# Patient Record
Sex: MALE | Race: WHITE | Employment: FULL TIME | URBAN - METROPOLITAN AREA
[De-identification: names, ages, dates, MRNs, and addresses within clinical notes are randomized per-mention and may not be internally consistent; named-entity substitution may affect disease eponyms.]

---

## 2017-03-20 ENCOUNTER — APPOINTMENT (OUTPATIENT)
Dept: GENERAL RADIOLOGY | Age: 42
End: 2017-03-20
Attending: EMERGENCY MEDICINE
Payer: OTHER MISCELLANEOUS

## 2017-03-20 ENCOUNTER — HOSPITAL ENCOUNTER (EMERGENCY)
Age: 42
Discharge: HOME | End: 2017-03-20
Attending: EMERGENCY MEDICINE
Payer: OTHER MISCELLANEOUS

## 2017-03-20 VITALS
WEIGHT: 156 LBS | RESPIRATION RATE: 18 BRPM | OXYGEN SATURATION: 99 % | HEART RATE: 88 BPM | BODY MASS INDEX: 23.11 KG/M2 | TEMPERATURE: 98 F | SYSTOLIC BLOOD PRESSURE: 137 MMHG | DIASTOLIC BLOOD PRESSURE: 90 MMHG | HEIGHT: 69 IN

## 2017-03-20 DIAGNOSIS — S93.402A MILD ANKLE SPRAIN, LEFT, INITIAL ENCOUNTER: Primary | ICD-10-CM

## 2017-03-20 PROCEDURE — 99282 EMERGENCY DEPT VISIT SF MDM: CPT

## 2017-03-20 PROCEDURE — 73610 X-RAY EXAM OF ANKLE: CPT

## 2017-03-20 RX ORDER — IBUPROFEN 800 MG/1
800 TABLET ORAL EVERY 8 HOURS
Qty: 15 TAB | Refills: 0 | Status: SHIPPED | OUTPATIENT
Start: 2017-03-20 | End: 2017-03-25

## 2017-03-20 RX ORDER — HYDROCODONE BITARTRATE AND ACETAMINOPHEN 5; 325 MG/1; MG/1
TABLET ORAL
Qty: 6 TAB | Refills: 0 | Status: SHIPPED | OUTPATIENT
Start: 2017-03-20 | End: 2020-10-03

## 2017-03-20 NOTE — DISCHARGE INSTRUCTIONS
SPECIFIC PATIENT INSTRUCTIONS FROM THE PHYSICIAN WHO TREATED YOU IN THE ER TODAY:  1. Wear the air splint for the next 1-2 weeks. 2. Take the ibuprofen as prescribed until finished. 3. Take the Norco for pain not controlled by the ibuprofen. 4. Follow up with your orthopedist or the one listed below if can not walk/ bear weight without air splint or crutches after 3-4 days. 5. IF crutches were given to you, use them as instructed for the next week, slowly increasing your weight bearing on your sprained ankle with the assistance of the crutches. Ankle Sprain: Care Instructions  Your Care Instructions    An ankle sprain can happen when you twist your ankle. The ligaments that support the ankle can get stretched and torn. Often the ankle is swollen and painful. Ankle sprains may take from several weeks to several months to heal. Usually, the more pain and swelling you have, the more severe your ankle sprain is and the longer it will take to heal. You can heal faster and regain strength in your ankle with good home treatment. It is very important to give your ankle time to heal completely, so that you do not easily hurt your ankle again. Follow-up care is a key part of your treatment and safety. Be sure to make and go to all appointments, and call your doctor if you are having problems. It's also a good idea to know your test results and keep a list of the medicines you take. How can you care for yourself at home? · Prop up your foot on pillows as much as possible for the next 3 days. Try to keep your ankle above the level of your heart. This will help reduce the swelling. · Follow your doctor's directions for wearing a splint or elastic bandage. Wrapping the ankle may help reduce or prevent swelling. · Your doctor may give you a splint, a brace, an air stirrup, or another form of ankle support to protect your ankle until it is healed. Wear it as directed while your ankle is healing.  Do not remove it unless your doctor tells you to. After your ankle has healed, ask your doctor whether you should wear the brace when you exercise. · Put ice or cold packs on your injured ankle for 10 to 20 minutes at a time. Try to do this every 1 to 2 hours for the next 3 days (when you are awake) or until the swelling goes down. Put a thin cloth between the ice and your skin. · You may need to use crutches until you can walk without pain. If you do use crutches, try to bear some weight on your injured ankle if you can do so without pain. This helps the ankle heal.  · Take pain medicines exactly as directed. ¨ If the doctor gave you a prescription medicine for pain, take it as prescribed. ¨ If you are not taking a prescription pain medicine, ask your doctor if you can take an over-the-counter medicine. · If you have been given ankle exercises to do at home, do them exactly as instructed. These can promote healing and help prevent lasting weakness. When should you call for help? Call your doctor now or seek immediate medical care if:  · Your pain is getting worse. · Your swelling is getting worse. · Your splint feels too tight or you are unable to loosen it. Watch closely for changes in your health, and be sure to contact your doctor if:  · You are not getting better after 1 week. Where can you learn more? Go to http://gisela-ivan.info/. Enter F625 in the search box to learn more about \"Ankle Sprain: Care Instructions. \"  Current as of: May 23, 2016  Content Version: 11.1  © 3738-4547 Healthwise, [employer]. Care instructions adapted under license by [employer] (which disclaims liability or warranty for this information). If you have questions about a medical condition or this instruction, always ask your healthcare professional. [address] any warranty or liability for your use of this information.          Ankle Sprain: Rehab Exercises  Your Care Instructions  Here are some examples of typical rehabilitation exercises for your condition. Start each exercise slowly. Ease off the exercise if you start to have pain. Your doctor or physical therapist will tell you when you can start these exercises and which ones will work best for you. How to do the exercises  \"Alphabet\" exercise    1. Trace the alphabet with your toe. This helps your ankle move in all directions. Side-to-side knee swing exercise    1. Sit in a chair with your foot flat on the floor. 2. Slowly move your knee from side to side. Keep your foot pressed flat. 3. Continue this exercise for 2 to 3 minutes. Towel curl    1. While sitting, place your foot on a towel on the floor. Scrunch the towel toward you with your toes. 2. Then use your toes to push the towel away from you. 3. To make this exercise more challenging you can put something on the other end of the towel. A can of soup is about the right weight for this. Towel stretch    1. Sit with your legs extended and knees straight. 2. Place a towel around your foot just under the toes. 3. Hold each end of the towel in each hand, with your hands above your knees. 4. Pull back with the towel so that your foot stretches toward you. 5. Hold the position for at least 15 to 30 seconds. 6. Repeat 2 to 4 times a session. Do up to 5 sessions a day. Ankle eversion exercise    1. Start by sitting with your foot flat on the floor. Push your foot outward against a wall or a piece of furniture that doesn't move. Hold for about 6 seconds, and relax. Repeat 8 to 12 times. 2. After you feel comfortable with this, try using rubber tubing looped around the outside of your feet for resistance. Push your foot out to the side against the tubing, and then count to 10 as you slowly bring your foot back to the middle. Repeat 8 to 12 times. Isometric opposition exercises    1. While sitting, put your feet together flat on the floor.   2. Press your injured foot inward against your other foot. Hold for about 6 seconds, and relax. Repeat 8 to 12 times. 3. Then place the heel of your other foot on top of the injured one. Push down with the top heel while trying to push up with your injured foot. Hold for about 6 seconds, and relax. Repeat 8 to 12 times. Resisted ankle inversion    1. Sit on the floor with your good leg crossed over your other leg. 2. Hold both ends of an exercise band and loop the band around the inside of your affected foot. Then press your other foot against the band. 3. Keeping your legs crossed, slowly push your affected foot against the band so that foot moves away from your other foot. Then slowly relax. 4. Repeat 8 to 12 times. Resisted ankle eversion    1. Sit on the floor with your legs straight. 2. Hold both ends of an exercise band and loop the band around the outside of your affected foot. Then press your other foot against the band. 3. Keeping your leg straight, slowly push your affected foot outward against the band and away from your other foot without letting your leg rotate. Then slowly relax. 4. Repeat 8 to 12 times. Resisted ankle dorsiflexion    1. Tie the ends of an exercise band together to form a loop. Attach one end of the loop to a secure object or shut a door on it to hold it in place. (Or you can have someone hold one end of the loop to provide resistance.)  2. While sitting on the floor or in a chair, loop the other end of the band over the top of your affected foot. 3. Keeping your knee and leg straight, slowly flex your foot to pull back on the exercise band, and then slowly relax. 4. Repeat 8 to 12 times. Single-leg balance    1. Stand on a flat surface with your arms stretched out to your sides like you are making the letter \"T. \" Then lift your good leg off the floor, bending it at the knee. If you are not steady on your feet, use one hand to hold on to a chair, counter, or wall.   2. Standing on the leg with your affected ankle, keep that knee straight. Try to balance on that leg for up to 30 seconds. Then rest for up to 10 seconds. 3. Repeat 6 to 8 times. 4. When you can balance on your affected leg for 30 seconds with your eyes open, try to balance on it with your eyes closed. When you can do this exercise with your eyes closed for 30 seconds and with ease and no pain, try standing on a pillow or piece of foam, and repeat steps 1 through 4. Follow-up care is a key part of your treatment and safety. Be sure to make and go to all appointments, and call your doctor if you are having problems. It's also a good idea to know your test results and keep a list of the medicines you take. Where can you learn more? Go to http://gisela-ivan.info/. Kristen Rollins in the search box to learn more about \"Ankle Sprain: Rehab Exercises. \"  Current as of: May 23, 2016  Content Version: 11.1  © 2797-5963 [employer]. Care instructions adapted under license by [employer] (which disclaims liability or warranty for this information). If you have questions about a medical condition or this instruction, always ask your healthcare professional. [place] [address] any warranty or liability for your use of this information. [facility] Activation    Thank you for requesting access to [facility]. Please follow the instructions below to securely access and download your online medical record. [facility] allows you to send messages to your doctor, view your test results, renew your prescriptions, schedule appointments, and more. How Do I Sign Up? 1. In your internet browser, go to [email or web address]. 2. Click on the First Time User? Click Here link in the Sign In box. You will see the New Member Sign Up page. 3. Enter your [facility]by Access Code exactly as it appears below. You will not need to use this code after youve completed the sign-up process.  If you do not sign up before the expiration date, you must request a new code. [facility] Access Code: [email or web address]  Expires: 2017  2:36 PM (This is the date your [employer] access code will )    4. Enter the last four digits of your Social Security Number (xxxx) and Date of Birth (mm/dd/yyyy) as indicated and click Submit. You will be taken to the next sign-up page. 5. Create a [facility]sec ID. This will be your [facility] login ID and cannot be changed, so think of one that is secure and easy to remember. 6. Create a [facility] password. You can change your password at any time. 7. Enter your Password Reset Question and Answer. This can be used at a later time if you forget your password. 8. Enter your e-mail address. You will receive e-mail notification when new information is available in [address] 9. Click Sign Up. You can now view and download portions of your medical record. 10. Click the Download Summary menu link to download a portable copy of your medical information. Additional Information    If you have questions, please visit the Frequently Asked Questions section of the Nop[employer] website at [email or web address] Remember, [employer] is NOT to be used for urgent needs. For medical emergencies, dial 911.

## 2017-03-20 NOTE — ED TRIAGE NOTES
Pt c/o hurting left ankle on Tuesday. Pt states it hurt then & was seen at [facility].  Today it's better.

## 2017-03-20 NOTE — ED PROVIDER NOTES
Coleen Felipe  [facility] EMERGENCY DEPT      40 y.o. male with noted past medical history who presents to the emergency department left ankle pain onset 3/14/17. Pt explains he was at work when stepped into a hole in grass and rolled ankle. Pt reports extensive hx of rolling his ankle. Pt reports ankle swelling. Pt elevated and iced ankle which did decrease the swelling. [facility] but was told by his work that he had to be seen at [facility] for compensation. Pt recieved X[facility] at [facility].    No other complaints. No current facility-administered medications for this encounter. No current outpatient prescriptions on file. No past medical history on file. No past surgical history on file. No family history on file. Social History     Social History    Marital status: SINGLE     Spouse name: N/A    Number of children: N/A    Years of education: N/A     Occupational History    Not on file. Social History Main Topics    Smoking status: Current Every Day Smoker    Smokeless tobacco: Not on file    Alcohol use No    Drug use: No    Sexual activity: Not on file     Other Topics Concern    Not on file     Social History Narrative    No narrative on file       No Known Allergies    Patient's primary care provider (as noted in EPIC):  None    REVIEW OF SYSTEMS:    Constitutional:  Negative for diaphoresis. HENT:  Negative for congestion. Respiratory:  Negative for cough and shortness of breath. Cardiovascular:  Negative for chest pain and palpitations. Gastrointestinal:  Negative for diarrhea. Genitourinary:  Negative for flank pain. Musculoskeletal:  Negative for back pain. Positive for left ankle pain. Skin:  Negative for pallor. Neurological:  Negative for weakness.      Visit Vitals    /90 (BP 1 Location: Left arm, BP Patient Position: At rest)    Pulse 88    Temp 98 °F (36.7 °C)    Resp 18    Ht 5' 9\" (1.753 m)    Wt 70.8 kg (156 lb)  SpO2 99%    BMI 23.04 kg/m2       PHYSICAL EXAM:    CONSTITUTIONAL:  Alert, in no apparent distress;  well developed;  well nourished. HEAD:  Normocephalic, atraumatic. EYES:  EOMI. Non-icteric sclera. Normal conjunctiva. ENTM:  Nose:  no rhinorrhea. Throat:  no erythema or exudate, mucous membranes moist.  NECK:  No JVD. Supple  RESPIRATORY:  Chest clear, equal breath sounds, good air movement. CARDIOVASCULAR:  Regular rate and rhythm. No murmurs, rubs, or gallops. GI:  Normal bowel sounds, abdomen soft and non-tender. No rebound or guarding. BACK:  Non-tender. UPPER EXT:  Normal inspection. LOWER EXT:  No edema, no calf tenderness. Distal pulses intact. NEURO:  Moves all four extremities, and grossly normal motor exam.  SKIN:  No rashes;  Normal for age. PSYCH:  Alert and normal affect. Left ankle exam:    No lateral malleolar tenderness to palpation. No medial malleolar tenderness to palpation. No swelling noted. Normal foot exam with no base of 5th tenderness to palpation. DIFFERENTIAL DIAGNOSES/ MEDICAL DECISION MAKING:  Contusion, sprain, dislocation, fracture, ligamentous tear/ disruption or a combination of the above. Abnormal lab results from this emergency department encounter:  Labs Reviewed - No data to display    Lab values for this patient within approximately the last 12 hours:  No results found for this or any previous visit (from the past 12 hour(s)). Radiologist and cardiologist interpretations if available at time of this note:  XR ANKLE LT MIN 3 V   Final Result          Medication(s) ordered for patient during this emergency visit encounter:  Medications - No data to display    Left ankle x-ray:  Initial interpretation/reading by the emergency physician:  Interpretation of the ordered X-ray(s) shows no fractures or dislocations.     IMPRESSION AND MEDICAL DECISION MAKING:  Based upon the patients presentation with noted HPI and PE, along with the work up done in the emergency department, I believe that the patient is having noted ankle sprain. DIAGNOSIS:  1. Ankle sprain, left    SPECIFIC PATIENT INSTRUCTIONS FROM THE PHYSICIAN WHO TREATED YOU IN THE ER TODAY:  1. Wear the air splint for the next 1-2 weeks. 2. Take the ibuprofen as prescribed until finished. 3. Take the Norco for pain not controlled by the ibuprofen. 4. Follow up with your orthopedist or the one listed below if can not walk/ bear weight without air splint or crutches after 3-4 days. 5. IF crutches were given to you, use them as instructed for the next week, slowly increasing your weight bearing on your sprained ankle with the assistance of the crutches. Tor Scott M.D. Provider Attestation:  If a scribe was utilized in generation of this patient record, I personally performed the services described in the documentation, reviewed the documentation, as recorded by the scribe in my presence, and it accurately records the patient's history of presenting illness, review of systems, patient physical examination, and procedures performed by me as the attending physician. Tor Scott M.D.   AB Board Certified Emergency Physician  3/20/2017.  3:39 PM    Scribe Attestation:     Yuko Martinez, scribing for and in the presence of  Elio Blunt MD March 20, 2017 at 3:44 PM     Signed by: Summer Blanco, 03/20/17, 3:44 PM

## 2017-03-20 NOTE — LETTER
[address] [place] [address] [facility] EMERGENCY DEPT 
[address] [facility] [address] 
[phone] Work/School Note Date: 3/20/2017 To Whom It May concern: 
 
Farhana Gamble was seen and treated today in the emergency room by the following provider(s): 
Attending Provider: Zahira Wilson MD. Farhana Gamble may return to work on 3/22/17 Sincerely, 
 
Dr. Zahira Wilson

## 2022-08-28 NOTE — ED NOTES
I have reviewed discharge instructions with the patient. The patient verbalized understanding. Patient armband removed and shredded  Pt left ED ambulatory, alert and in NAD.
27-Aug-2022 14:05